# Patient Record
(demographics unavailable — no encounter records)

---

## 2024-12-12 NOTE — IMAGING
[Right] : right wrist [de-identified] : She is alert and oriented vital signs are stable.  Examination of right wrist reveals no swelling.  She has a 0.5 x 0.5 cm soft mobile mass overlying the volar radial aspect of her right wrist overlying the region of the scaphoid tubercle.  She has full range of motion of the wrist.  She has no tenderness around the scaphoid dorsally or the scapholunate ligament or TFCC.  Does have tenderness around the scaphoid tubercle.  She has full range of motion of the wrist but slight pain on dorsiflexion.  The flexors and extensors are intact.  The FPL and EPL are intact.  She had a normal vascular exam  She had a normal neurologic exam. Normal skin exam. No evidence for open wounds infection or compartment syndrome. [FreeTextEntry8] : X-rays right wrist 3 views revealed no fracture or dislocations.

## 2024-12-12 NOTE — HISTORY OF PRESENT ILLNESS
[de-identified] : Patient is a 34 right-hand-dominant female presents with a cyst on her right wrist for many years.  States the cyst has been there for a long period time but recently over the past few months started bothering her.  She complains of pain with range of motion of localizes it to the volar radial aspect of her wrist.

## 2024-12-12 NOTE — ASSESSMENT
[FreeTextEntry1] : Probable right wrist volar radial ganglion cyst  Plan I discussed with the patient this time by an aspiration which she like to try.  Under sterile conditions an aspiration of the cyst was performed.  No fluid was aspirated.  At this point to the fac no fluid was aspirated I do recommend getting an MRI of the right wrist to further clarify confirm this is a ganglion cyst.  If it is to be a candidate for an ultrasound-guided aspiration and injection or possibly surgical excision.  She tolerated the procedure well.  She is agreeable with the plan.  Patient was advised if they develop any severe pain, numbness or tingling or pain with range of motion to the fingers they must call or go to the emergency room immediately. All the signs and symptoms of compartment syndrome were explained.  The patient understands.  This medical record was created utilizing Dragon voice recognition software.  This software is not perfect and may occasionally create typographical errors which may be reflected in the above medical record.

## 2024-12-12 NOTE — PROCEDURE
[Medium Joint Injection] : medium joint injection [Right] : of the right [Wrist] : wrist [Pain] : pain [Inflammation] : inflammation [Alcohol] : alcohol [Betadine] : betadine [Ethyl Chloride sprayed topically] : ethyl chloride sprayed topically [Sterile technique used] : sterile technique used [Ganglion cyst] : ganglion cyst [de-identified] : Right wrist. 0cc [FreeTextEntry3] : The risk, benefits and alternatives of the aspiration were explained to the patient in extensive details the risks included to but not limited to infection bleeding injury nerves loss range of motion injury to the artery recurrence of the cyst and the fact that no fluid may be able to be gotten out.  Patient is stage all the risks and benefits and alternatives agreed agreed with the procedure and she tolerated it well.